# Patient Record
Sex: MALE | Race: WHITE
[De-identification: names, ages, dates, MRNs, and addresses within clinical notes are randomized per-mention and may not be internally consistent; named-entity substitution may affect disease eponyms.]

---

## 2022-07-23 ENCOUNTER — HOSPITAL ENCOUNTER (EMERGENCY)
Dept: HOSPITAL 12 - ER | Age: 32
LOS: 1 days | Discharge: HOME | End: 2022-07-24
Payer: COMMERCIAL

## 2022-07-23 VITALS — BODY MASS INDEX: 28 KG/M2 | WEIGHT: 200 LBS | HEIGHT: 71 IN

## 2022-07-23 DIAGNOSIS — R11.2: Primary | ICD-10-CM

## 2022-07-23 DIAGNOSIS — D72.829: ICD-10-CM

## 2022-07-23 DIAGNOSIS — Z90.49: ICD-10-CM

## 2022-07-23 DIAGNOSIS — R19.7: ICD-10-CM

## 2022-07-23 PROCEDURE — 85025 COMPLETE CBC W/AUTO DIFF WBC: CPT

## 2022-07-23 PROCEDURE — 36415 COLL VENOUS BLD VENIPUNCTURE: CPT

## 2022-07-23 PROCEDURE — 80076 HEPATIC FUNCTION PANEL: CPT

## 2022-07-23 PROCEDURE — A4663 DIALYSIS BLOOD PRESSURE CUFF: HCPCS

## 2022-07-23 PROCEDURE — 96374 THER/PROPH/DIAG INJ IV PUSH: CPT

## 2022-07-23 PROCEDURE — 80048 BASIC METABOLIC PNL TOTAL CA: CPT

## 2022-07-23 PROCEDURE — 96361 HYDRATE IV INFUSION ADD-ON: CPT

## 2022-07-23 PROCEDURE — 85007 BL SMEAR W/DIFF WBC COUNT: CPT

## 2022-07-23 PROCEDURE — 83690 ASSAY OF LIPASE: CPT

## 2022-07-23 PROCEDURE — 99284 EMERGENCY DEPT VISIT MOD MDM: CPT

## 2022-07-24 VITALS — SYSTOLIC BLOOD PRESSURE: 110 MMHG | DIASTOLIC BLOOD PRESSURE: 55 MMHG

## 2022-07-24 LAB
ALP SERPL-CCNC: 80 U/L (ref 50–136)
ALT SERPL W/O P-5'-P-CCNC: 71 U/L (ref 16–63)
AST SERPL-CCNC: 29 U/L (ref 15–37)
BILIRUB DIRECT SERPL-MCNC: 0.2 MG/DL (ref 0–0.2)
BILIRUB SERPL-MCNC: 1.2 MG/DL (ref 0.2–1)
BUN SERPL-MCNC: 20 MG/DL (ref 7–18)
CHLORIDE SERPL-SCNC: 99 MMOL/L (ref 98–107)
CO2 SERPL-SCNC: 25 MMOL/L (ref 21–32)
CREAT SERPL-MCNC: 1.2 MG/DL (ref 0.6–1.3)
EOSINOPHIL NFR BLD MANUAL: 1 % (ref 0–8)
GLUCOSE SERPL-MCNC: 148 MG/DL (ref 74–106)
HCT VFR BLD AUTO: 52 % (ref 36.7–47.1)
LIPASE SERPL-CCNC: 100 U/L (ref 73–393)
LYMPHOCYTES NFR BLD MANUAL: 4 % (ref 20–40)
MCH RBC QN AUTO: 30.6 UUG (ref 23.8–33.4)
MCV RBC AUTO: 87.3 FL (ref 73–96.2)
MONOCYTES NFR BLD MANUAL: 5 % (ref 2–10)
NEUTS BAND NFR BLD MANUAL: 9 % (ref 0–10)
NEUTS SEG NFR BLD MANUAL: 81 % (ref 42–75)
PLATELET # BLD AUTO: 280 K/UL (ref 152–348)
POTASSIUM SERPL-SCNC: 4.8 MMOL/L (ref 3.5–5.1)
WBC NRBC COR # BLD AUTO: 0.1 /100WBC
WS STN SPEC: 9.3 G/DL (ref 6.4–8.2)

## 2022-07-24 NOTE — NUR
Pt resting in bed at this time. No distress noted. IV infusing ordered fluids. 
Tolerated drinking water without nausea or vomiting